# Patient Record
Sex: FEMALE | Race: WHITE | NOT HISPANIC OR LATINO | URBAN - METROPOLITAN AREA
[De-identification: names, ages, dates, MRNs, and addresses within clinical notes are randomized per-mention and may not be internally consistent; named-entity substitution may affect disease eponyms.]

---

## 2020-02-20 ENCOUNTER — PROCEDURE VISIT (OUTPATIENT)
Dept: NEUROLOGY | Facility: CLINIC | Age: 45
End: 2020-02-20

## 2020-02-20 DIAGNOSIS — M54.2 CHRONIC NECK PAIN WITH HISTORY OF CERVICAL SPINAL SURGERY: ICD-10-CM

## 2020-02-20 DIAGNOSIS — G89.28 CHRONIC NECK PAIN WITH HISTORY OF CERVICAL SPINAL SURGERY: ICD-10-CM

## 2020-02-20 DIAGNOSIS — Z98.890 CHRONIC NECK PAIN WITH HISTORY OF CERVICAL SPINAL SURGERY: ICD-10-CM

## 2020-02-20 DIAGNOSIS — G56.01 CARPAL TUNNEL SYNDROME OF RIGHT WRIST: Primary | ICD-10-CM

## 2020-02-20 PROCEDURE — 95910 NRV CNDJ TEST 7-8 STUDIES: CPT | Performed by: PSYCHIATRY & NEUROLOGY

## 2020-02-20 PROCEDURE — 95886 MUSC TEST DONE W/N TEST COMP: CPT | Performed by: PSYCHIATRY & NEUROLOGY

## 2020-02-20 NOTE — PROGRESS NOTES
EMG and Nerve Conduction Studies    The complete report includes the data sheets.     Referring Doctor: Carlos Khan MD    History: This patient is a 44-year-old female.  She complains of numbness and tingling in the right hand intermittently.  She complains of neck pain with pain and tingling across the shoulder and arm.    Results:    1.  The right median sensory distal latency is prolonged the right median motor conduction study is normal    2.  The left median sensory and motor conduction study is normal.    3.  The right and left ulnar sensory and motor conduction studies are normal.    4.  EMG of the muscles examined in the C5-T1 myotomes were normal bilaterally.    Impression:    This is an abnormal study.  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  There is no evidence of left median neuropathy or ulnar neuropathy on the right or the left.  EMG of the muscles examined in the C5-T1 myotomes were normal bilaterally.    Joseph Seipel, M.D.

## 2020-06-17 ENCOUNTER — TELEPHONE (OUTPATIENT)
Dept: ENDOCRINOLOGY | Facility: CLINIC | Age: 45
End: 2020-06-17

## 2020-06-17 NOTE — TELEPHONE ENCOUNTER
SPOKE WITH PATIENT ABOUT SCHEDULING THE NP REFERRAL FROM . SHE HAD NO IDEA SHE WAS EVEN BEING REFERRED TO OUR OFFICE. SHE STATES SHE WILL CALL DR. GOMEZ'S AND VERIFY WITH HIM THE REASON FOR REFERRAL AND IS IT NECESSARY.